# Patient Record
Sex: FEMALE | Race: WHITE | NOT HISPANIC OR LATINO | URBAN - NONMETROPOLITAN AREA
[De-identification: names, ages, dates, MRNs, and addresses within clinical notes are randomized per-mention and may not be internally consistent; named-entity substitution may affect disease eponyms.]

---

## 2020-06-09 LAB
BILIRUB SERPL-MCNC: NEGATIVE MG/DL
BLOOD URINE, POC: NORMAL
CLARITY, POC UA: NORMAL
COLOR, POC UA: NORMAL
GLUCOSE UR QL STRIP: NEGATIVE
KETONES UR QL STRIP: NORMAL
LEUKOCYTE EST, POC UA: NEGATIVE
NITRITE, POC UA: NEGATIVE
PH, POC UA: 7.5
PROTEIN, POC: NEGATIVE
SPECIFIC GRAVITY, POC UA: 1.02
UROBILINOGEN, POC UA: NORMAL

## 2020-06-23 ENCOUNTER — HISTORICAL (OUTPATIENT)
Dept: ADMINISTRATIVE | Facility: HOSPITAL | Age: 35
End: 2020-06-23

## 2020-07-14 ENCOUNTER — HISTORICAL (OUTPATIENT)
Dept: ADMINISTRATIVE | Facility: HOSPITAL | Age: 35
End: 2020-07-14

## 2020-10-05 LAB
BILIRUB SERPL-MCNC: NEGATIVE MG/DL
BLOOD URINE, POC: NORMAL
CLARITY, POC UA: CLEAR
COLOR, POC UA: YELLOW
GLUCOSE UR QL STRIP: NEGATIVE
KETONES UR QL STRIP: NEGATIVE
LEUKOCYTE EST, POC UA: NEGATIVE
NITRITE, POC UA: NEGATIVE
PH, POC UA: 7
POC BETA-HCG (QUAL): POSITIVE
PROTEIN, POC: NEGATIVE
SPECIFIC GRAVITY, POC UA: 1.02
UROBILINOGEN, POC UA: NORMAL

## 2020-10-30 LAB — POC BETA-HCG (QUAL): POSITIVE

## 2022-04-10 ENCOUNTER — HISTORICAL (OUTPATIENT)
Dept: ADMINISTRATIVE | Facility: HOSPITAL | Age: 37
End: 2022-04-10

## 2022-04-24 VITALS
SYSTOLIC BLOOD PRESSURE: 120 MMHG | DIASTOLIC BLOOD PRESSURE: 68 MMHG | WEIGHT: 205 LBS | HEIGHT: 61 IN | BODY MASS INDEX: 38.71 KG/M2

## 2022-07-01 LAB
PAP RECOMMENDATION EXT: NORMAL
PAP SMEAR: NORMAL

## 2022-09-21 ENCOUNTER — HISTORICAL (OUTPATIENT)
Dept: ADMINISTRATIVE | Facility: HOSPITAL | Age: 37
End: 2022-09-21

## 2023-01-17 ENCOUNTER — DOCUMENTATION ONLY (OUTPATIENT)
Dept: ADMINISTRATIVE | Facility: HOSPITAL | Age: 38
End: 2023-01-17
Payer: COMMERCIAL

## 2023-06-13 ENCOUNTER — TELEPHONE (OUTPATIENT)
Dept: OBSTETRICS AND GYNECOLOGY | Facility: CLINIC | Age: 38
End: 2023-06-13
Payer: COMMERCIAL

## 2023-06-13 DIAGNOSIS — Z30.9 ENCOUNTER FOR CONTRACEPTIVE MANAGEMENT, UNSPECIFIED TYPE: Primary | ICD-10-CM

## 2023-06-13 RX ORDER — NORETHINDRONE AND ETHINYL ESTRADIOL 7-9-5
KIT ORAL
COMMUNITY
Start: 2022-12-27 | End: 2023-06-13 | Stop reason: SDUPTHER

## 2023-06-13 RX ORDER — NORETHINDRONE AND ETHINYL ESTRADIOL 7-9-5
1 KIT ORAL DAILY
Qty: 28 EACH | Refills: 2 | Status: SHIPPED | OUTPATIENT
Start: 2023-06-13 | End: 2023-06-15 | Stop reason: SDUPTHER

## 2023-06-13 NOTE — TELEPHONE ENCOUNTER
Meds sent to [facility] in [place]----- Message from Anayeli Gutierrez sent at 6/13/2023  2:29 PM CDT -----  Regarding: RX REFILL  PT NEEDS A REFILL ON HER BC. PT SAID [facility] IN [place] HAS SENT A REQUEST SEVERAL TIMES AND HAS NOT HEARD FROM US AND THE PT IS COMPLETELY OUT.

## 2023-06-15 DIAGNOSIS — Z30.9 ENCOUNTER FOR CONTRACEPTIVE MANAGEMENT, UNSPECIFIED TYPE: ICD-10-CM

## 2023-06-15 RX ORDER — NORETHINDRONE AND ETHINYL ESTRADIOL 7-9-5
1 KIT ORAL DAILY
Qty: 28 EACH | Refills: 2 | Status: SHIPPED | OUTPATIENT
Start: 2023-06-15 | End: 2023-11-27 | Stop reason: SDUPTHER

## 2023-07-03 ENCOUNTER — OFFICE VISIT (OUTPATIENT)
Dept: OBSTETRICS AND GYNECOLOGY | Facility: CLINIC | Age: 38
End: 2023-07-03
Payer: COMMERCIAL

## 2023-07-03 VITALS
SYSTOLIC BLOOD PRESSURE: 128 MMHG | HEIGHT: 60 IN | BODY MASS INDEX: 41.77 KG/M2 | DIASTOLIC BLOOD PRESSURE: 68 MMHG | HEART RATE: 90 BPM | WEIGHT: 212.75 LBS

## 2023-07-03 DIAGNOSIS — Z01.419 ROUTINE GYNECOLOGICAL EXAMINATION: Primary | ICD-10-CM

## 2023-07-03 PROCEDURE — 3008F PR BODY MASS INDEX (BMI) DOCUMENTED: ICD-10-PCS | Mod: CPTII,,, | Performed by: NURSE PRACTITIONER

## 2023-07-03 PROCEDURE — 3074F PR MOST RECENT SYSTOLIC BLOOD PRESSURE < 130 MM HG: ICD-10-PCS | Mod: CPTII,,, | Performed by: NURSE PRACTITIONER

## 2023-07-03 PROCEDURE — 99395 PR PREVENTIVE VISIT,EST,18-39: ICD-10-PCS | Mod: ,,, | Performed by: NURSE PRACTITIONER

## 2023-07-03 PROCEDURE — 1159F PR MEDICATION LIST DOCUMENTED IN MEDICAL RECORD: ICD-10-PCS | Mod: CPTII,,, | Performed by: NURSE PRACTITIONER

## 2023-07-03 PROCEDURE — 3078F PR MOST RECENT DIASTOLIC BLOOD PRESSURE < 80 MM HG: ICD-10-PCS | Mod: CPTII,,, | Performed by: NURSE PRACTITIONER

## 2023-07-03 PROCEDURE — 3078F DIAST BP <80 MM HG: CPT | Mod: CPTII,,, | Performed by: NURSE PRACTITIONER

## 2023-07-03 PROCEDURE — 1160F RVW MEDS BY RX/DR IN RCRD: CPT | Mod: CPTII,,, | Performed by: NURSE PRACTITIONER

## 2023-07-03 PROCEDURE — 3074F SYST BP LT 130 MM HG: CPT | Mod: CPTII,,, | Performed by: NURSE PRACTITIONER

## 2023-07-03 PROCEDURE — 1160F PR REVIEW ALL MEDS BY PRESCRIBER/CLIN PHARMACIST DOCUMENTED: ICD-10-PCS | Mod: CPTII,,, | Performed by: NURSE PRACTITIONER

## 2023-07-03 PROCEDURE — 1159F MED LIST DOCD IN RCRD: CPT | Mod: CPTII,,, | Performed by: NURSE PRACTITIONER

## 2023-07-03 PROCEDURE — 3008F BODY MASS INDEX DOCD: CPT | Mod: CPTII,,, | Performed by: NURSE PRACTITIONER

## 2023-07-03 PROCEDURE — 99395 PREV VISIT EST AGE 18-39: CPT | Mod: ,,, | Performed by: NURSE PRACTITIONER

## 2023-07-03 NOTE — PROGRESS NOTES
Patient ID: [id number]   Chief Complaint: Annual exam  Chief Complaint   Patient presents with    Annual Exam     PT PRESENTS TO CLINIC FOR ANNUAL EXAM, OTHERWISE NO COMPLAINTS     HPI:   Germaine Yeager is a 37 y.o. year old  here for her Annual Exam. Patient's last menstrual period was 2023. She is doing well. Denies any health changes. Annual Exam (PT PRESENTS TO CLINIC FOR ANNUAL EXAM, OTHERWISE NO COMPLAINTS)    Subjective:   History reviewed. No pertinent past medical history.  History reviewed. No pertinent surgical history.  Social History     Tobacco Use    Smoking status: Never     Passive exposure: Never    Smokeless tobacco: Never   Substance Use Topics    Alcohol use: Never    Drug use: Never     History reviewed. No pertinent family history.  OB History    Para Term  AB Living   2 2 2     2   SAB IAB Ectopic Multiple Live Births           2      # Outcome Date GA Lbr Mark/2nd Weight Sex Delivery Anes PTL Lv   2 Term 14 39w0d  3.175 kg (7 lb) F Vag-Spont EPI N MARY   1 Term 13 39w0d  3.175 kg (7 lb) F Vag-Spont EPI N MARY       Current Outpatient Medications:     norethin-e.estradiol triphasic (JEANNIE 28) 0.5/1/0.5-35 mg-mcg Tab, Take 1 tablet by mouth once daily., Disp: 28 each, Rfl: 2  MENARCHEAL:  Cycle Length: 3 days   Flow: normal  Dysmenorrhea: No  If yes: Mild  Intermenstrual Bleeding: No  PAP:  Last PAP: 2022    History of Abnormal PAP Smear: NO  Treated: N/A  HPV Vaccine: NO  INTERCOURSE:  Dyspareunia: No  Postcoital Bleeding: No  History of STI: HPV   If yes, then: HPV  Current Birth Control Method: OCP (estrogen/progesterone)  Sexually Active: YES  Review of Systems 12 point review of systems conducted, negative except as stated in the history of present illness. See HPI for details.  Objective:   Visit Vitals  /68   Pulse 90   Ht 5' (1.524 m)   Wt 96.5 kg (212 lb 11.9 oz)   LMP 2023   BMI 41.55 kg/m²     Physical Exam:  Physical  Exam  Constitutional:  General Appearance : alert, in no acute distress, normal, well nourished.  Respiratory:  Respiratory Effort: normal.  Breast:  Right: Inspection/palpation: no discharge, no masses present, no nipple retraction, no skin changes, no skin dimpling, no tenderness, no lymphadenopathy, no axillary mass, no axillary tenderness.  Left: Inspection/palpation: no discharge, no masses present, no nipple retraction, no skin changes, no skin dimpling, no tenderness, no lymphadenopathy, no axillary mass, no axillary tenderness.  Gastrointestinal:  Abdomen: no masses. no tender, nondistended.  Liver and spleen: normal  Hernias: no hernias present, no inguinal adenopathy.  Genitourinary:  External Genitalia: normal, no lesions.  Vagina: normal appearance, no abnormal discharge, no lesions.  Bladder: no mass, nontender.  Urethra: no erythema or lesions present.  Cervix: no lesions, non tender. Pap Done  Uterus: nontender, normal contour, normal mobility, normal size.   Adnexa: no masses, no tenderness.  Anus and Perineum: visually normal.   Chaperone Present  No results found for this or any previous visit (from the past 24 hour(s)).  Assessment/Plan:   Assessment:   Routine gynecological examination  -     Liquid-Based Pap Smear, Screening Screening      Follow up in about 1 year (around 7/3/2024) for WWE. In addition to their scheduled FU, the patient has also been instructed to follow up on as needed basis. All questions were answered and the patient voiced understanding of the above issues.

## 2023-07-18 ENCOUNTER — TELEPHONE (OUTPATIENT)
Dept: OBSTETRICS AND GYNECOLOGY | Facility: CLINIC | Age: 38
End: 2023-07-18
Payer: COMMERCIAL

## 2023-07-18 NOTE — TELEPHONE ENCOUNTER
----- Message from LINUS Fernandez sent at 7/18/2023  2:37 PM CDT -----  Abnormal Pap ASCUS and HPV positive. Contact pt to schedule Colposcopy

## 2023-07-18 NOTE — TELEPHONE ENCOUNTER
CALLED PT.  CONFIRMED. INFORMED OF ASCUS PAP WITH POSITIVE HPV AND NEED FOR COLPO.  COLPO SCHEDULED.

## 2023-07-20 LAB — PSYCHE PATHOLOGY RESULT: NORMAL

## 2023-07-24 ENCOUNTER — DOCUMENTATION ONLY (OUTPATIENT)
Dept: OBSTETRICS AND GYNECOLOGY | Facility: CLINIC | Age: 38
End: 2023-07-24
Payer: COMMERCIAL

## 2023-07-24 NOTE — PROGRESS NOTES
RECEIVED PAP RESULTS -RESULTS ASCUS---HPV INVALID ---DR. BRENNAN REVIEWED ---HPV WILL HAVE TO BE RETESTED TO DETERMINE COURSE OF TREATMENT --ATTEMPTED TO REACH PATIENT -PHONE NOT ACCEPTING CALLS , UNABLE TO LEAVE MESSAGE. PATIENT NOT ON PORTAL

## 2023-07-25 ENCOUNTER — DOCUMENTATION ONLY (OUTPATIENT)
Dept: OBSTETRICS AND GYNECOLOGY | Facility: CLINIC | Age: 38
End: 2023-07-25
Payer: COMMERCIAL

## 2023-07-25 NOTE — PROGRESS NOTES
"Called patient  verified---hpv results invalid will need to repeat HPV testing -patient states" has appt for colpo." Will need to repeat HPV testing on that visit     "

## 2023-08-10 ENCOUNTER — OFFICE VISIT (OUTPATIENT)
Dept: OBSTETRICS AND GYNECOLOGY | Facility: CLINIC | Age: 38
End: 2023-08-10
Payer: COMMERCIAL

## 2023-08-10 VITALS
DIASTOLIC BLOOD PRESSURE: 90 MMHG | BODY MASS INDEX: 41.82 KG/M2 | HEIGHT: 60 IN | WEIGHT: 213 LBS | HEART RATE: 88 BPM | SYSTOLIC BLOOD PRESSURE: 130 MMHG

## 2023-08-10 DIAGNOSIS — R87.610 ATYPICAL SQUAMOUS CELLS OF UNDETERMINED SIGNIFICANCE (ASCUS) ON PAPANICOLAOU SMEAR OF CERVIX: Primary | ICD-10-CM

## 2023-08-10 PROCEDURE — 99499 NO LOS: ICD-10-PCS | Mod: ,,, | Performed by: OBSTETRICS & GYNECOLOGY

## 2023-08-10 PROCEDURE — 99499 UNLISTED E&M SERVICE: CPT | Mod: ,,, | Performed by: OBSTETRICS & GYNECOLOGY

## 2023-08-10 NOTE — PROGRESS NOTES
Patient ID: [id number]   Chief Complaint: HPV TESTING   HPI:     Germaine Yeager is a 37 y.o.  here today for HPV TESTING   PT PRESENTS TO CLINIC FOR HPV TESTING . PAP WAS ASCUS AND HPV INVALID ON LAST VISIT. NO C/O'S.  EXPLAINED THE IMPORTANCE OF HPV TESTING AS THIS WILL GUIDE US ON HOW TO PROCEED WITH FURTHER EVAL OF ABNL PAP.     Patient's last menstrual period was 2023 (exact date).    Past Medical History:  has a past medical history of Abnormal Pap smear of cervix and HPV in female.    Surgical History:  has no past surgical history on file.    Family History: family history includes Breast cancer in her maternal grandmother; Diabetes in her father, mother, and paternal grandmother.    Social History:  reports that she has never smoked. She has never been exposed to tobacco smoke. She has never used smokeless tobacco. She reports current alcohol use. She reports that she does not use drugs.    Current Outpatient Medications   Medication Sig Dispense Refill    norethin-e.estradiol triphasic (JEANNIE 28) 0.5/1/0.5-35 mg-mcg Tab Take 1 tablet by mouth once daily. 28 each 2     No current facility-administered medications for this visit.       Patient is allergic to aspirin.     MENARCHEAL:  Cycle Length: 5 days   Flow: light  Dysmenorrhea: No  Intermenstrual Bleeding: No  PAP:  Last PAP: 2023    History of Abnormal PAP Smear: YES: 20 YEARS AGO  Treated:UNKNOWN  HPV Vaccine: NO  INTERCOURSE:  Dyspareunia: No  Postcoital Bleeding: No  History of STI: Yes   If yes, then:HPV  Current Birth Control Method: OCP (estrogen/progesterone)  Sexually Active: yes  BREAST HISTORY:   Last Mammogram: N/A  COLONOSCOPY:  Last Colonoscopy: NONE            No results found for this or any previous visit (from the past 24 hour(s)).    Subjective:     Review of Systems    12 point review of systems conducted, negative except as stated in the history of present illness. See HPI for details.      Objective:     Visit  Vitals  BP (!) 130/90   Pulse 88   Ht 5' (1.524 m)   Wt 96.6 kg (213 lb)   LMP 08/02/2023 (Exact Date)   BMI 41.60 kg/m²       Physical Exam  Constitutional:  General Appearance : alert, in no acute distress, normal, well nourished.  Neck/Thyroid:  Inspection/Palpation: normal. Thyroid: normal size and shape.  Respiratory:  Respiratory Effort: normal.  Gastrointestinal:  Abdomen: no masses. no tender, nondistended.  Liver and spleen: normal  Hernias: no hernias present, no inguinal adenopathy.  Genitourinary:  External Genitalia: normal, no lesions.  Vagina: normal appearance, no abnormal discharge, no lesions.  Bladder: no mass, nontender.  Urethra: no erythema or lesions present.  Cervix: no lesions, non tender. HPV COLLECTE  Uterus: nontender, normal contour, normal mobility, normal size.   Adnexa: no masses, no tenderness.  Anus and Perineum: visually normal.   Chaperone Present  Assessment:     No diagnosis found.  Plan   There are no diagnoses linked to this encounter.    No follow-ups on file. In addition to their scheduled follow up, the patient has also been instructed to follow up on as needed basis.     Chacho Klein LPN

## 2023-08-16 ENCOUNTER — TELEPHONE (OUTPATIENT)
Dept: OBSTETRICS AND GYNECOLOGY | Facility: CLINIC | Age: 38
End: 2023-08-16
Payer: COMMERCIAL

## 2023-08-16 NOTE — TELEPHONE ENCOUNTER
----- Message from Hong Horowitz MD sent at 8/15/2023 12:48 PM CDT -----  PLEASE INFORM PATIENT THAT SHE IS HPV POSITIVE AND WILL NEED COLPO

## 2023-09-07 ENCOUNTER — PROCEDURE VISIT (OUTPATIENT)
Dept: OBSTETRICS AND GYNECOLOGY | Facility: CLINIC | Age: 38
End: 2023-09-07
Payer: COMMERCIAL

## 2023-09-07 VITALS
HEART RATE: 94 BPM | WEIGHT: 213 LBS | DIASTOLIC BLOOD PRESSURE: 78 MMHG | HEIGHT: 60 IN | BODY MASS INDEX: 41.82 KG/M2 | RESPIRATION RATE: 18 BRPM | SYSTOLIC BLOOD PRESSURE: 130 MMHG | OXYGEN SATURATION: 99 %

## 2023-09-07 DIAGNOSIS — R87.610 ASCUS WITH POSITIVE HIGH RISK HPV CERVICAL: Primary | ICD-10-CM

## 2023-09-07 DIAGNOSIS — R87.810 ASCUS WITH POSITIVE HIGH RISK HPV CERVICAL: Primary | ICD-10-CM

## 2023-09-07 LAB
B-HCG UR QL: NEGATIVE
CTP QC/QA: YES

## 2023-09-07 PROCEDURE — 57455 PR COLPOSCOPY,CERVIX W/ADJ VAGINA,W/BX: ICD-10-PCS | Mod: ,,, | Performed by: OBSTETRICS & GYNECOLOGY

## 2023-09-07 PROCEDURE — 81025 URINE PREGNANCY TEST: CPT | Mod: ,,, | Performed by: OBSTETRICS & GYNECOLOGY

## 2023-09-07 PROCEDURE — 81025 POCT URINE PREGNANCY: ICD-10-PCS | Mod: ,,, | Performed by: OBSTETRICS & GYNECOLOGY

## 2023-09-07 PROCEDURE — 99499 UNLISTED E&M SERVICE: CPT | Mod: ,,, | Performed by: OBSTETRICS & GYNECOLOGY

## 2023-09-07 PROCEDURE — 57455 BIOPSY OF CERVIX W/SCOPE: CPT | Mod: ,,, | Performed by: OBSTETRICS & GYNECOLOGY

## 2023-09-07 PROCEDURE — 99499 NO LOS: ICD-10-PCS | Mod: ,,, | Performed by: OBSTETRICS & GYNECOLOGY

## 2023-09-07 NOTE — PROCEDURES
Colposcopy    Date/Time: 9/7/2023 3:00 PM    Performed by: Hong Horowitz MD  Authorized by: Hong Horowitz MD    Consent Done?:  Yes (Written)  Timeout:Immediately prior to procedure a time out was called to verify the correct patient, procedure, equipment, support staff and site/side marked as required  Prep:Patient was prepped and draped in the usual sterile fashion  Assistants?: Yes    List of assistants:  SELMA MARROQUIN LPN    Colposcopy Site:  Cervix  Position:  Supine  Acrowhite Lesion? Yes    Atypical Vessels: No    Transformation Zone Adequate?: Yes    Biopsy?: Yes         Location:  Cervix ((12 00))  ECC Performed?: No    LEEP Performed?: No    Estimated blood loss (cc):  0   Patient tolerated the procedure well with no immediate complications.   Post-operative instructions were provided for the patient.   Patient was discharged and will follow up if any complications occur

## 2023-09-07 NOTE — PROCEDURES
Germaine Yeager is a 37 y.o. female patient.    Pulse: 94 (09/07/23 1440)  Resp: 18 (09/07/23 1440)  BP: 130/78 (09/07/23 1440)  SpO2: 99 % (09/07/23 1440)  Weight: 96.6 kg (213 lb) (09/07/23 1440)  Height: 5' (152.4 cm) (09/07/23 1440)       Procedures    9/7/2023

## 2023-09-11 LAB — PSYCHE PATHOLOGY RESULT: NORMAL

## 2023-09-12 ENCOUNTER — TELEPHONE (OUTPATIENT)
Dept: OBSTETRICS AND GYNECOLOGY | Facility: CLINIC | Age: 38
End: 2023-09-12
Payer: COMMERCIAL

## 2023-09-12 NOTE — TELEPHONE ENCOUNTER
Called patient - verified ---instructed will not need further tx to keep yearly pap appts . Patient verbalized understanding ----- Message from Hnog Horowitz MD sent at 2023  7:41 AM CDT -----  PLEASE CALL PATIENT WITH RESULTS. WILL NOT NEED FURTHER TREATMENT. WILL NEED PAP IN ONE YEAR.

## 2023-11-27 DIAGNOSIS — Z30.9 ENCOUNTER FOR CONTRACEPTIVE MANAGEMENT, UNSPECIFIED TYPE: ICD-10-CM

## 2023-11-27 RX ORDER — NORETHINDRONE AND ETHINYL ESTRADIOL 7-9-5
1 KIT ORAL DAILY
Qty: 28 EACH | Refills: 2 | Status: SHIPPED | OUTPATIENT
Start: 2023-11-27 | End: 2024-05-13

## 2024-01-23 ENCOUNTER — TELEPHONE (OUTPATIENT)
Dept: OBSTETRICS AND GYNECOLOGY | Facility: CLINIC | Age: 39
End: 2024-01-23
Payer: COMMERCIAL

## 2024-01-23 DIAGNOSIS — Z30.9 ENCOUNTER FOR CONTRACEPTIVE MANAGEMENT, UNSPECIFIED TYPE: Primary | ICD-10-CM

## 2024-01-23 RX ORDER — NORETHINDRONE ACETATE AND ETHINYL ESTRADIOL, ETHINYL ESTRADIOL AND FERROUS FUMARATE 1MG-10(24)
1 KIT ORAL DAILY
Qty: 28 TABLET | Refills: 5 | Status: SHIPPED | OUTPATIENT
Start: 2024-01-23 | End: 2025-01-22

## 2024-01-23 NOTE — TELEPHONE ENCOUNTER
ATTEMPTED TO CALL PAT NO ANSWER TO INFORMED BIRTH CONTROL CHANGE SENT TO HER PHARMACY----- Message from Lacy Lejeune, MA sent at 1/23/2024 11:32 AM CST -----  Regarding: CALL BACK  PT IS CURRENTLY ON BIRTH CONTROL BUT THE ONE SHE IS ON IS ON BACK ORDER AND SHE ISNT SURE WHAT SHE NEEDS TO DO OR IF WE CAN SWITCH IT TO SOMETHING SIMILAR?

## 2024-03-19 ENCOUNTER — TELEPHONE (OUTPATIENT)
Dept: OBSTETRICS AND GYNECOLOGY | Facility: CLINIC | Age: 39
End: 2024-03-19
Payer: COMMERCIAL

## 2024-03-19 NOTE — TELEPHONE ENCOUNTER
Called and spoke to patient, describes has not had a cycle since switching birth controls. Educated patient it is normal to not having a cycle on certain birth controls. Instructed to call us back for any other questions. ----- Message from Anayeli Gutierrez sent at 3/19/2024  9:21 AM CDT -----  Regarding: CALL BACK  Pt needs a nurse to call her back. Has some questions about her birth control.

## 2024-07-09 ENCOUNTER — TELEPHONE (OUTPATIENT)
Dept: OBSTETRICS AND GYNECOLOGY | Facility: CLINIC | Age: 39
End: 2024-07-09
Payer: COMMERCIAL

## 2024-07-09 DIAGNOSIS — Z30.9 ENCOUNTER FOR CONTRACEPTIVE MANAGEMENT, UNSPECIFIED TYPE: ICD-10-CM

## 2024-07-09 RX ORDER — NORETHINDRONE ACETATE AND ETHINYL ESTRADIOL, ETHINYL ESTRADIOL AND FERROUS FUMARATE 1MG-10(24)
1 KIT ORAL DAILY
Qty: 28 TABLET | Refills: 6 | Status: SHIPPED | OUTPATIENT
Start: 2024-07-09

## 2024-07-09 NOTE — TELEPHONE ENCOUNTER
Refill sent to pharmacy----- Message from Anayeli Gutierrez sent at 7/9/2024  3:24 PM CDT -----  Regarding: bc refill  Pt needs refills sent in on her birth control to [facility] in [place]. She ran out yesterday.

## 2024-07-16 ENCOUNTER — OFFICE VISIT (OUTPATIENT)
Dept: OBSTETRICS AND GYNECOLOGY | Facility: CLINIC | Age: 39
End: 2024-07-16
Payer: COMMERCIAL

## 2024-07-16 VITALS
WEIGHT: 207.5 LBS | SYSTOLIC BLOOD PRESSURE: 124 MMHG | OXYGEN SATURATION: 99 % | DIASTOLIC BLOOD PRESSURE: 84 MMHG | HEIGHT: 60 IN | HEART RATE: 85 BPM | BODY MASS INDEX: 40.74 KG/M2

## 2024-07-16 DIAGNOSIS — Z01.419 ROUTINE GYNECOLOGICAL EXAMINATION: Primary | ICD-10-CM

## 2024-07-16 PROCEDURE — 3079F DIAST BP 80-89 MM HG: CPT | Mod: CPTII,,, | Performed by: NURSE PRACTITIONER

## 2024-07-16 PROCEDURE — 3074F SYST BP LT 130 MM HG: CPT | Mod: CPTII,,, | Performed by: NURSE PRACTITIONER

## 2024-07-16 PROCEDURE — 99395 PREV VISIT EST AGE 18-39: CPT | Mod: ,,, | Performed by: NURSE PRACTITIONER

## 2024-07-16 PROCEDURE — 87661 TRICHOMONAS VAGINALIS AMPLIF: CPT | Performed by: NURSE PRACTITIONER

## 2024-07-16 PROCEDURE — 87491 CHLMYD TRACH DNA AMP PROBE: CPT | Performed by: NURSE PRACTITIONER

## 2024-07-16 PROCEDURE — 1159F MED LIST DOCD IN RCRD: CPT | Mod: CPTII,,, | Performed by: NURSE PRACTITIONER

## 2024-07-16 PROCEDURE — 87591 N.GONORRHOEAE DNA AMP PROB: CPT | Performed by: NURSE PRACTITIONER

## 2024-07-16 PROCEDURE — 3008F BODY MASS INDEX DOCD: CPT | Mod: CPTII,,, | Performed by: NURSE PRACTITIONER

## 2024-07-16 PROCEDURE — 1160F RVW MEDS BY RX/DR IN RCRD: CPT | Mod: CPTII,,, | Performed by: NURSE PRACTITIONER

## 2024-07-16 NOTE — PROGRESS NOTES
Patient ID: [id number]   Chief Complaint: Annual exam  Chief Complaint   Patient presents with    Gynecologic Exam    Annual Exam     HPI:   Germaine Yeager is a 38 y.o. year old  here for her Annual Exam.   No LMP recorded. (Menstrual status: Birth Control). Patient presents to clinic today for annual visit. Patient reports no new concerns for today.   She is doing well. Denies any health changes.   Gynecologic Exam and Annual Exam    Subjective:     Past Medical History:   Diagnosis Date    Abnormal Pap smear of cervix     HPV in female      History reviewed. No pertinent surgical history.  Social History     Tobacco Use    Smoking status: Never     Passive exposure: Never    Smokeless tobacco: Never   Substance Use Topics    Alcohol use: Not Currently     Comment: SOCIAL    Drug use: Never     Family History   Problem Relation Name Age of Onset    Diabetes Paternal Grandmother Na     Breast cancer Maternal Grandmother Na     Diabetes Father Na     Diabetes Mother Na      OB History    Para Term  AB Living   3 2 2   1 2   SAB IAB Ectopic Multiple Live Births           2      # Outcome Date GA Lbr Mark/2nd Weight Sex Type Anes PTL Lv   3 AB 20 16w0d          2 Term 14 39w0d  3.175 kg (7 lb) F Vag-Spont EPI N MARY   1 Term 13 39w0d  3.175 kg (7 lb) F Vag-Spont EPI N MARY       Current Outpatient Medications:     norethindrone-e.estradioL-iron (LO LOESTRIN FE) 1 mg-10 mcg (24)/10 mcg (2) Tab, Take 1 tablet by mouth Daily., Disp: 28 tablet, Rfl: 6    norethin-e.estradiol triphasic (JEANNIE 28) 0.5/1/0.5-35 mg-mcg Tab, Take 1 tablet by mouth once daily., Disp: 28 each, Rfl: 2  MENARCHEAL:  Birth control   PAP:  Last PAP: 2023    History of Abnormal PAP Smear: YES:    23   Treated: Colposcopy  HPV Vaccine: NO  INTERCOURSE:  Dyspareunia: No  Postcoital Bleeding: No  History of STI: No  Current Birth Control Method: OCP (estrogen/progesterone)  Sexually Active:  yes          Review of Systems 12 point review of systems conducted, negative except as stated in the history of present illness.     See HPI for details.  Objective:   Visit Vitals  /84 (BP Location: Left arm, Patient Position: Sitting)   Pulse 85   Ht 5' (1.524 m)   Wt 94.1 kg (207 lb 8 oz)   SpO2 99%   BMI 40.52 kg/m²     No results found for this or any previous visit (from the past 24 hour(s)).  Physical Exam:  Chaperone present for exam.  Physical Exam  Constitutional:  General Appearance : alert, in no acute distress, normal, well nourished.  Cardiovascular:   Regular rate and rhythm.  Respiratory:  Respiratory Effort: normal.  Breast:  Right: Inspection/palpation: no discharge, no masses present, no nipple retraction, no skin changes, no skin dimpling, no tenderness, no lymphadenopathy, no axillary mass, no axillary tenderness.  Left: Inspection/palpation: no discharge, no masses present, no nipple retraction, no skin changes, no skin dimpling, no tenderness, no lymphadenopathy, no axillary mass, no axillary tenderness.  Gastrointestinal:  Abdomen: no masses. no tender, nondistended.  Genitourinary:  External Genitalia: normal, no lesions.  Vagina: normal appearance, no abnormal discharge, no lesions.  Bladder: no mass, nontender.  Urethra: no erythema or lesions present.  Cervix: no lesions, non tender. Pap Done  Uterus: nontender, normal contour, normal mobility, normal size.   Adnexa: no masses, no tenderness.  Anus and Perineum: visually normal.     No results found for this or any previous visit (from the past 24 hour(s)).  Assessment/Plan:   Assessment:   Routine gynecological examination  -     Liquid-Based Pap Smear, Screening; Future; Expected date: 07/24/2024      No follow-ups on file.     In addition to their scheduled follow-up, the patient has also been instructed to follow up on as needed basis.   All questions were answered and the patient voiced understanding of the above issues.

## 2024-07-19 LAB
CHLAMYDIA TRACHOMATIS: NEGATIVE
NEISSERIA GONORRHOEAE: NEGATIVE
PSYCHE PATHOLOGY RESULT: NORMAL
TRICHOMONAS VAGINALIS: NEGATIVE

## 2025-01-16 ENCOUNTER — TELEPHONE (OUTPATIENT)
Dept: OBSTETRICS AND GYNECOLOGY | Facility: CLINIC | Age: 40
End: 2025-01-16
Payer: COMMERCIAL

## 2025-01-16 DIAGNOSIS — Z30.9 ENCOUNTER FOR CONTRACEPTIVE MANAGEMENT, UNSPECIFIED TYPE: ICD-10-CM

## 2025-01-16 RX ORDER — NORETHINDRONE ACETATE AND ETHINYL ESTRADIOL, ETHINYL ESTRADIOL AND FERROUS FUMARATE 1MG-10(24)
1 KIT ORAL DAILY
Qty: 28 TABLET | Refills: 6 | Status: SHIPPED | OUTPATIENT
Start: 2025-01-16

## 2025-01-16 NOTE — TELEPHONE ENCOUNTER
----- Message from Felisha sent at 1/16/2025  3:23 PM CST -----  Regarding: call back  Pt said [facility] has sent over a refill request twice for her birth control and they have not heard anything from us.

## 2025-07-21 ENCOUNTER — OFFICE VISIT (OUTPATIENT)
Dept: OBSTETRICS AND GYNECOLOGY | Facility: CLINIC | Age: 40
End: 2025-07-21
Payer: COMMERCIAL

## 2025-07-21 VITALS
DIASTOLIC BLOOD PRESSURE: 74 MMHG | SYSTOLIC BLOOD PRESSURE: 134 MMHG | HEIGHT: 60 IN | BODY MASS INDEX: 38.77 KG/M2 | HEART RATE: 72 BPM | WEIGHT: 197.5 LBS

## 2025-07-21 DIAGNOSIS — Z12.4 PAP SMEAR FOR CERVICAL CANCER SCREENING: ICD-10-CM

## 2025-07-21 DIAGNOSIS — Z01.419 ENCOUNTER FOR ANNUAL ROUTINE GYNECOLOGICAL EXAMINATION: Primary | ICD-10-CM

## 2025-07-21 PROCEDURE — 99395 PREV VISIT EST AGE 18-39: CPT | Mod: ,,, | Performed by: NURSE PRACTITIONER

## 2025-07-21 PROCEDURE — 3078F DIAST BP <80 MM HG: CPT | Mod: CPTII,,, | Performed by: NURSE PRACTITIONER

## 2025-07-21 PROCEDURE — 1160F RVW MEDS BY RX/DR IN RCRD: CPT | Mod: CPTII,,, | Performed by: NURSE PRACTITIONER

## 2025-07-21 PROCEDURE — 1159F MED LIST DOCD IN RCRD: CPT | Mod: CPTII,,, | Performed by: NURSE PRACTITIONER

## 2025-07-21 PROCEDURE — 3008F BODY MASS INDEX DOCD: CPT | Mod: CPTII,,, | Performed by: NURSE PRACTITIONER

## 2025-07-21 PROCEDURE — 3075F SYST BP GE 130 - 139MM HG: CPT | Mod: CPTII,,, | Performed by: NURSE PRACTITIONER

## 2025-07-21 NOTE — PROGRESS NOTES
"Patient ID: [id number]   Chief Complaint:   Chief Complaint   Patient presents with    Annual Exam     NO C/O'S.     HPI:   Germaine Yeager is a 39 y.o. year old  here for her Annual Exam.   She is doing well. Denies any health changes. Denies complaints today.  No LMP recorded (lmp unknown). (Menstrual status: Birth Control).     Subjective:     Past Medical History:   Diagnosis Date    Abnormal Pap smear of cervix     HPV in female      History reviewed. No pertinent surgical history.  Social History[1]  Family History   Problem Relation Name Age of Onset    Diabetes Paternal Grandmother Na     Breast cancer Maternal Grandmother Na     Diabetes Father Na     Diabetes Mother Na      OB History    Para Term  AB Living   3 2 2  1 2   SAB IAB Ectopic Multiple Live Births       2      # Outcome Date GA Lbr Mark/2nd Weight Sex Type Anes PTL Lv   3 AB 20 16w0d          2 Term 14 39w0d  3.175 kg (7 lb) F Vag-Spont EPI N MARY   1 Term 13 39w0d  3.175 kg (7 lb) F Vag-Spont EPI N MARY     Current Medications[2]  MENARCHEAL:  NOT CYCLING ON ORAL OCP"S  Intermenstrual Bleeding: No  PAP:  Last PAP: 24  History of Abnormal PAP Smear: YES: HPV     Treated: Colposcopy  HPV Vaccine: NO  INTERCOURSE:  Dyspareunia: No  Postcoital Bleeding: No  History of STI: Yes   If yes, then HPV  Current Birth Control Method: OCP (estrogen/progesterone)  Sexually Active: yes  BREAST HISTORY:   Last Mammogram: N/A  COLONOSCOPY:  Last Colonoscopy:   N/A        Review of Systems 12 point review of systems conducted, negative except as stated in the history of present illness.     See HPI for details.  Objective:   Visit Vitals  /74 (BP Location: Left arm, Patient Position: Sitting)   Pulse 72   Ht 5' (1.524 m)   Wt 89.6 kg (197 lb 8 oz)   LMP  (LMP Unknown)   BMI 38.57 kg/m²       Physical Exam:    Constitutional:  General Appearance : alert, in no acute distress, normal, well " nourished.  Cardiovascular:   Regular rate and rhythm.  Respiratory:  Respiratory Effort: normal.    Breast:  Right: Inspection/palpation: no discharge, no masses present, no nipple retraction, no skin changes, no skin dimpling, no tenderness, no lymphadenopathy, no axillary mass, no axillary tenderness.  Left: Inspection/palpation: no discharge, no masses present, no nipple retraction, no skin changes, no skin dimpling, no tenderness, no lymphadenopathy, no axillary mass, no axillary tenderness.    Gastrointestinal:  Abdomen: no masses. no tender, nondistended.    Genitourinary:  External Genitalia: normal, no lesions.  Vagina: normal appearance, no abnormal discharge, no lesions.  Bladder: no mass, nontender.  Urethra: no erythema or lesions present.  Cervix: no lesions, non tender. Pap Done DECLINES STD TESTING VIA PAP  Uterus: nontender, normal contour, normal mobility, normal size.   Adnexa: no masses, no tenderness.  Anus and Perineum: visually normal.     Chaperone present for interview and exam.  Assessment/Plan:   Assessment:   Encounter for annual routine gynecological examination  -     Liquid-Based Pap Smear, Screening    Pap smear for cervical cancer screening  -     Liquid-Based Pap Smear, Screening      Follow up in about 1 year (around 7/21/2026) for ANNUAL.   In addition to their scheduled follow-up, the patient has also been instructed to follow up on as needed basis.   All questions were answered and the patient voiced understanding of the above issues.           [1]   Social History  Tobacco Use    Smoking status: Never     Passive exposure: Never    Smokeless tobacco: Never   Substance Use Topics    Alcohol use: Not Currently     Comment: RARE    Drug use: Never   [2]   Current Outpatient Medications:     norethindrone-e.estradioL-iron (LO LOESTRIN FE) 1 mg-10 mcg (24)/10 mcg (2) Tab, Take 1 tablet by mouth Daily., Disp: 28 tablet, Rfl: 6

## 2025-07-23 LAB — PSYCHE PATHOLOGY RESULT: NORMAL

## 2025-07-30 DIAGNOSIS — Z30.9 ENCOUNTER FOR CONTRACEPTIVE MANAGEMENT, UNSPECIFIED TYPE: ICD-10-CM

## 2025-07-30 RX ORDER — NORETHINDRONE ACETATE AND ETHINYL ESTRADIOL, ETHINYL ESTRADIOL AND FERROUS FUMARATE 1MG-10(24)
1 KIT ORAL
Qty: 28 TABLET | Refills: 5 | Status: SHIPPED | OUTPATIENT
Start: 2025-07-30